# Patient Record
Sex: FEMALE | Race: WHITE | URBAN - METROPOLITAN AREA
[De-identification: names, ages, dates, MRNs, and addresses within clinical notes are randomized per-mention and may not be internally consistent; named-entity substitution may affect disease eponyms.]

---

## 2023-10-11 PROBLEM — Z95.818 IMPLANTABLE LOOP RECORDER PRESENT: Status: ACTIVE | Noted: 2023-10-11

## 2023-10-11 PROBLEM — I47.10 PSVT (PAROXYSMAL SUPRAVENTRICULAR TACHYCARDIA): Status: ACTIVE | Noted: 2023-10-11

## 2023-10-11 PROBLEM — I48.0 PAF (PAROXYSMAL ATRIAL FIBRILLATION) (HCC): Status: ACTIVE | Noted: 2023-10-11

## 2023-10-11 NOTE — PROGRESS NOTES
Left atrium: The atrium was mildly dilated. 5. Right ventricle: The cavity size was normal. Systolic function      was reduced. 6. Right atrium: The atrium was mildly dilated. 7. Pulmonary arteries: Systolic pressure was within the normal      range. Estimated PA peak pressure is 22mm Hg (S). 8. Pericardium, extracardiac: A moderate, partially loculated      pericardial effusion was identified posterior to the heart and      along the left ventricular free wall, with no evidence of      tamponade. Nuclear stress: 9/2017  Exercise nuclear stress test. 6 minutes. Negative for ischemia. Normal perfusion and function    Event Monitor: 7/2014  1. Baseline rhythm is sinus  2. occasional premature atrial contractions  3. occasional premature ventricular contractions. One episode of ventricular bigeminy. 4. 2 episodes of nonsustained ventricular tachycardia. One was 6 beats at a heart rate of 136 beats per minute. The other was 5 beats followed by a pause and then 4 beats after that. 5. patient developed SVT at rate of around 170 beats per minute for 3 minutes on 27 July 2014 at around 16:38 hrs. patient was symptomatic during this episode. 6. Monitor period was 30 days. 7. recommend further workup if clinically indicated. Cath: 3/2011  No CAD. Had positive cardiac bio-markers from svt        I independently reviewed the cardiac diagnostic studies, ECG and relevant imaging studies. No results found for: \"LVEF\", \"LVEFMODE\"  No results found for: \"TSHFT4\", \"TSH\"    Physical Examination:  Vitals:    10/12/23 1356   BP: 112/74   Pulse: 80   SpO2: 98%      Wt Readings from Last 3 Encounters:   10/12/23 159 lb (72.1 kg)   04/02/15 162 lb (73.5 kg)       Constitutional: Oriented. No distress. Head: Normocephalic and atraumatic. Mouth/Throat: Oropharynx is clear and moist.   Eyes: Conjunctivae normal. EOM are normal.   Neck: Neck supple. No rigidity. No JVD present.     Cardiovascular: Normal rate,

## 2023-10-12 ENCOUNTER — OFFICE VISIT (OUTPATIENT)
Dept: CARDIOLOGY CLINIC | Age: 68
End: 2023-10-12
Payer: MEDICARE

## 2023-10-12 VITALS
WEIGHT: 159 LBS | HEIGHT: 67 IN | SYSTOLIC BLOOD PRESSURE: 112 MMHG | DIASTOLIC BLOOD PRESSURE: 74 MMHG | BODY MASS INDEX: 24.96 KG/M2 | HEART RATE: 80 BPM | OXYGEN SATURATION: 98 %

## 2023-10-12 DIAGNOSIS — I47.10 PSVT (PAROXYSMAL SUPRAVENTRICULAR TACHYCARDIA): ICD-10-CM

## 2023-10-12 DIAGNOSIS — G47.33 OSA ON CPAP: ICD-10-CM

## 2023-10-12 DIAGNOSIS — R06.02 SOB (SHORTNESS OF BREATH): ICD-10-CM

## 2023-10-12 DIAGNOSIS — Z95.818 IMPLANTABLE LOOP RECORDER PRESENT: ICD-10-CM

## 2023-10-12 DIAGNOSIS — I34.1 MVP (MITRAL VALVE PROLAPSE): ICD-10-CM

## 2023-10-12 DIAGNOSIS — I48.0 PAF (PAROXYSMAL ATRIAL FIBRILLATION) (HCC): Primary | ICD-10-CM

## 2023-10-12 PROCEDURE — 99204 OFFICE O/P NEW MOD 45 MIN: CPT | Performed by: INTERNAL MEDICINE

## 2023-10-12 PROCEDURE — 93000 ELECTROCARDIOGRAM COMPLETE: CPT | Performed by: INTERNAL MEDICINE

## 2023-10-12 PROCEDURE — 1123F ACP DISCUSS/DSCN MKR DOCD: CPT | Performed by: INTERNAL MEDICINE

## 2023-10-12 RX ORDER — ESCITALOPRAM OXALATE 20 MG/1
20 TABLET ORAL DAILY
COMMUNITY
Start: 2003-01-01

## 2023-10-12 RX ORDER — OMEPRAZOLE 40 MG/1
CAPSULE, DELAYED RELEASE ORAL
COMMUNITY
Start: 2023-07-24

## 2023-10-12 RX ORDER — APIXABAN 5 MG/1
TABLET, FILM COATED ORAL
COMMUNITY
Start: 2023-08-23

## 2023-10-12 RX ORDER — BUPROPION HYDROCHLORIDE 300 MG/1
TABLET ORAL
COMMUNITY
Start: 2023-09-20

## 2023-10-12 RX ORDER — METOPROLOL SUCCINATE 50 MG/1
TABLET, EXTENDED RELEASE ORAL
COMMUNITY
Start: 2023-09-20

## 2023-10-20 ENCOUNTER — PROCEDURE VISIT (OUTPATIENT)
Dept: CARDIOLOGY CLINIC | Age: 68
End: 2023-10-20

## 2023-10-20 DIAGNOSIS — I34.1 MVP (MITRAL VALVE PROLAPSE): ICD-10-CM

## 2023-10-20 DIAGNOSIS — I48.0 PAF (PAROXYSMAL ATRIAL FIBRILLATION) (HCC): ICD-10-CM

## 2023-10-20 DIAGNOSIS — I47.10 PSVT (PAROXYSMAL SUPRAVENTRICULAR TACHYCARDIA): ICD-10-CM

## 2023-10-26 NOTE — PROGRESS NOTES
[facility]  Advanced CHF/Pulmonary Hypertension   Cardiac Evaluation      Gabriel Nixon  YOB: 1955    Date of Visit:  10/27/23    Chief Complaint   Patient presents with    Established New Doctor    Shortness of Breath    Fatigue      History of Present Illness:  Myrtle Mao is a 76 y.o. female who presents from referral from Dr. Mitra Segovia for consultation and management of ongoing dyspnea. He recently saw her as a new patient with decreased stamina/energy, fatigue, and SOB. She is s/p Covid in September; she had episode of very high platelets that was treated with 3 pheresis treatments. She has a PMH HTN, MVP, MVr 08/2022, PAF post op MVr. 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 Her appendage was not occluded with her MV surgery. ECHO 10/20/23 with normal LV fx, LVH, bi-atrial enlargement. 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 Today, she states she feels she improved after surgery but has declined more recently with SOB. She completed Cardiac Rehab at [facility] after heart surgery. She checks BP at home 114/70's. She does not recall being on ACE/ARB. States she has been on Metoprolol for years due to SVTs. She states she just mailed her Holter back. She follows with Hematology at the [facility]. Last H/H in care everywhere 10.8/33.2. She will start Hydroxyurea. platelet count now 326. She states she is not symptomatic. If she becomes symptomatic with Myelofibrosis/ hemohromatosis, she will need a Stem Cell transplant. She was told to watch for night sweats, bone pain, mouth sores and weight loss. Discussed diastolic dysfunction with her blood disorder and mitral valve disease. Discussed Entresto. Also discussed patient's elevated potassium level and foods to avoid. We walked her in the halls today and she desaturated to 83-84% on room air after approx 200-300ft.   She had a rapid recovery to 96% with rest.  Heart rate increased
Laterality Date    BREAST ENHANCEMENT SURGERY      CT BONE MARROW BIOPSY  2023    CT BONE MARROW BIOPSY 2023     Family History   Problem Relation Age of Onset    Other Mother         blood disease    Diabetes Mother     Alcohol Abuse Father     Arrhythmia Brother     Mitral Valve Prolapse Brother      Social History     Socioeconomic History    Marital status:      Spouse name: Not on file    Number of children: Not on file    Years of education: Not on file    Highest education level: Not on file   Occupational History    Not on file   Tobacco Use    Smoking status: Former     Types: Cigarettes     Quit date: 1980     Years since quittin.1    Smokeless tobacco: Not on file   Vaping Use    Vaping Use: Some days   Substance and Sexual Activity    Alcohol use: Yes    Drug use: Never    Sexual activity: Defer   Other Topics Concern    Not on file   Social History Narrative    Not on file     Social Determinants of Health     Financial Resource Strain: Not on file   Food Insecurity: Not on file   Transportation Needs: Not on file   Physical Activity: Not on file   Stress: Not on file   Social Connections: Not on file   Intimate Partner Violence: Not on file   Housing Stability: Not on file       Review of Systems:   Constitutional: there has been no unanticipated weight loss. There's been no change in energy level, sleep pattern, or activity level. Eyes: No visual changes or diplopia. No scleral icterus. ENT: No Headaches, hearing loss or vertigo. No mouth sores or sore throat. Cardiovascular: Reviewed in HPI  Respiratory: No cough or wheezing, no sputum production. No hematemesis. Gastrointestinal: No abdominal pain, appetite loss, blood in stools. No change in bowel or bladder habits. Genitourinary: No dysuria, trouble voiding, or hematuria. Musculoskeletal:  No gait disturbance, weakness or joint complaints. Integumentary: No rash or pruritis.   Neurological: No headache,

## 2023-10-27 ENCOUNTER — OFFICE VISIT (OUTPATIENT)
Dept: CARDIOLOGY CLINIC | Age: 68
End: 2023-10-27

## 2023-10-27 VITALS
BODY MASS INDEX: 24.99 KG/M2 | OXYGEN SATURATION: 96 % | DIASTOLIC BLOOD PRESSURE: 44 MMHG | HEART RATE: 84 BPM | HEIGHT: 67 IN | SYSTOLIC BLOOD PRESSURE: 90 MMHG | WEIGHT: 159.2 LBS

## 2023-10-27 DIAGNOSIS — E83.110 HEREDITARY HEMOCHROMATOSIS (HCC): ICD-10-CM

## 2023-10-27 DIAGNOSIS — G47.33 OSA ON CPAP: ICD-10-CM

## 2023-10-27 DIAGNOSIS — R06.02 SOB (SHORTNESS OF BREATH): ICD-10-CM

## 2023-10-27 DIAGNOSIS — I51.89 DIASTOLIC DYSFUNCTION: Primary | ICD-10-CM

## 2023-10-27 DIAGNOSIS — R53.83 OTHER FATIGUE: ICD-10-CM

## 2023-10-27 DIAGNOSIS — I48.0 PAF (PAROXYSMAL ATRIAL FIBRILLATION) (HCC): ICD-10-CM

## 2023-10-27 DIAGNOSIS — R09.02 HYPOXIA: ICD-10-CM

## 2023-10-27 DIAGNOSIS — I34.1 MVP (MITRAL VALVE PROLAPSE): ICD-10-CM

## 2023-10-27 RX ORDER — METOPROLOL SUCCINATE 50 MG/1
25 TABLET, EXTENDED RELEASE ORAL EVERY MORNING
Qty: 30 TABLET | Refills: 0
Start: 2023-10-27

## 2023-10-27 NOTE — PATIENT INSTRUCTIONS
Plan:  Decrease Metoprolol to 25mg daily for now. Dr. Gautam Zaragoza may change that once the monitor has resulted. 2.   START Entresto 24-26mg HALF tablet twice a day. 3.   Labs in 2-3 weeks> BNP, CMP, TSH, Free T4.   4.   May begin treadmill exercise and watch pulse ox. Document the Oxygen level with Exercise and the Heart rate on the Lower dose of metoprolol. Also document BP on the new medication Entresto. 5.  Get scheduled for Cardiac MRI at [facility]   6.  Referral to Dr. Nacho Ponce for both KIESHA and Pulmonology/hypoxia. 7.  See Dr. Kashif José in 4-5 weeks.

## 2023-11-15 ENCOUNTER — TELEPHONE (OUTPATIENT)
Dept: CARDIOLOGY CLINIC | Age: 68
End: 2023-11-15

## 2023-11-16 LAB
ALBUMIN SERPL-MCNC: 4.5 G/DL (ref 3.5–5.2)
ALP BLD-CCNC: 104 IU/L (ref 36–123)
ALT SERPL-CCNC: 10 IU/L (ref 10–40)
ANION GAP SERPL CALCULATED.3IONS-SCNC: 7 MMOL/L (ref 4–16)
AST SERPL-CCNC: 27 IU/L (ref 10–40)
B-TYPE NATRIURETIC PEPTIDE: 240 PG/ML (ref 0–80)
BILIRUB SERPL-MCNC: 0.3 MG/DL (ref 0–1.2)
BUN BLDV-MCNC: 16 MG/DL (ref 8–26)
CALCIUM SERPL-MCNC: 9.6 MG/DL (ref 8.5–10.4)
CHLORIDE BLD-SCNC: 105 MEQ/L (ref 98–111)
CO2: 28 MMOL/L (ref 21–31)
CREAT SERPL-MCNC: 0.75 MG/DL (ref 0.6–1.2)
EGFR (CKD-EPI): 86 ML/MIN/1.73 M2
GLUCOSE BLD-MCNC: 88 MG/DL (ref 70–99)
POTASSIUM SERPL-SCNC: 5 MEQ/L (ref 3.6–5.1)
SODIUM BLD-SCNC: 140 MEQ/L (ref 135–145)
T4 FREE: 0.93 NG/DL (ref 0.93–1.7)
TOTAL PROTEIN: 6.5 G/DL (ref 6.4–8.3)
TSH ULTRASENSITIVE: 3.18 MCIU/ML (ref 0.27–4.2)

## 2023-11-16 NOTE — TELEPHONE ENCOUNTER
LMOM for patient letting her know, I have faxed over lab orders to [facility].

## 2023-11-21 NOTE — PROGRESS NOTES
needed for increased heart rate. 110 tablet 3    sacubitril-valsartan (ENTRESTO) 24-26 MG per tablet Take 1 tablet by mouth 2 times daily 180 tablet 3    sacubitril-valsartan (ENTRESTO) 24-26 MG per tablet Take 1 tablet by mouth 2 times daily 1 Bottle   [id number]   28 tablet 0    Boswellia-Glucosamine-Vit D (OSTEO BI-FLEX ONE PER DAY PO) Take by mouth daily      MAGNESIUM OXIDE PO Take 400 mg by mouth daily      MELATONIN PO Take by mouth      ELIQUIS 5 MG TABS tablet 1 tablet 2 times daily      buPROPion (WELLBUTRIN XL) 300 MG extended release tablet 1 tablet every morning      escitalopram (LEXAPRO) 20 MG tablet Take 1 tablet by mouth daily      omeprazole (PRILOSEC) 40 MG delayed release capsule 1 capsule as needed (heart burn)      citalopram (CELEXA) 20 MG tablet TAKE 1 TABLET BY MOUTH ONCE DAILY      Multiple Vitamins-Minerals (CENTRUM SILVER PO) Take  by mouth. aspirin 81 MG EC tablet Take 1 tablet by mouth daily       No current facility-administered medications for this visit. Past Medical History:   Diagnosis Date    Anxiety     Blood disorder     Depression     Heart murmur     Hemochromatosis     MVP (mitral valve prolapse)     SVT (supraventricular tachycardia)     Thrombocytosis      Past Surgical History:   Procedure Laterality Date    BREAST ENHANCEMENT SURGERY      CT BONE MARROW BIOPSY  2023    CT BONE MARROW BIOPSY 2023     Family History   Problem Relation Age of Onset    Other Mother         blood disease    Diabetes Mother     Alcohol Abuse Father     Arrhythmia Brother     Mitral Valve Prolapse Brother      Social History     Socioeconomic History    Marital status:       Spouse name: Not on file    Number of children: Not on file    Years of education: Not on file    Highest education level: Not on file   Occupational History    Not on file   Tobacco Use    Smoking status: Former     Types: Cigarettes     Quit date: 1980     Years since quittin.2

## 2023-12-01 ENCOUNTER — OFFICE VISIT (OUTPATIENT)
Dept: CARDIOLOGY CLINIC | Age: 68
End: 2023-12-01

## 2023-12-01 VITALS
HEIGHT: 67 IN | BODY MASS INDEX: 25.11 KG/M2 | SYSTOLIC BLOOD PRESSURE: 90 MMHG | WEIGHT: 160 LBS | OXYGEN SATURATION: 97 % | HEART RATE: 79 BPM | DIASTOLIC BLOOD PRESSURE: 60 MMHG

## 2023-12-01 DIAGNOSIS — I48.0 PAF (PAROXYSMAL ATRIAL FIBRILLATION) (HCC): ICD-10-CM

## 2023-12-01 DIAGNOSIS — E83.110 HEREDITARY HEMOCHROMATOSIS (HCC): ICD-10-CM

## 2023-12-01 DIAGNOSIS — G47.33 OSA ON CPAP: ICD-10-CM

## 2023-12-01 DIAGNOSIS — I51.89 DIASTOLIC DYSFUNCTION: Primary | ICD-10-CM

## 2023-12-01 DIAGNOSIS — I34.1 MVP (MITRAL VALVE PROLAPSE): ICD-10-CM

## 2023-12-01 DIAGNOSIS — R09.02 HYPOXIA: ICD-10-CM

## 2023-12-01 DIAGNOSIS — R06.02 SOB (SHORTNESS OF BREATH): ICD-10-CM

## 2023-12-01 RX ORDER — METOPROLOL SUCCINATE 25 MG/1
25 TABLET, EXTENDED RELEASE ORAL EVERY MORNING
Qty: 110 TABLET | Refills: 3 | Status: SHIPPED | OUTPATIENT
Start: 2023-12-01

## 2023-12-13 ENCOUNTER — HOSPITAL ENCOUNTER (OUTPATIENT)
Dept: MRI IMAGING | Age: 68
Discharge: HOME | End: 2023-12-13
Payer: MEDICARE

## 2023-12-13 PROBLEM — R09.02 HYPOXIA: Status: ACTIVE | Noted: 2023-12-13

## 2023-12-13 PROBLEM — R06.02 SOB (SHORTNESS OF BREATH): Status: ACTIVE | Noted: 2023-12-13

## 2023-12-13 PROCEDURE — A4216 STERILE WATER/SALINE, 10 ML: HCPCS | Performed by: INTERNAL MEDICINE

## 2023-12-13 PROCEDURE — A9585 GADOBUTROL INJECTION: HCPCS | Performed by: INTERNAL MEDICINE

## 2023-12-13 PROCEDURE — 75561 CARDIAC MRI FOR MORPH W/DYE: CPT | Performed by: INTERNAL MEDICINE

## 2023-12-13 PROCEDURE — 6360000004 HC RX CONTRAST MEDICATION: Performed by: INTERNAL MEDICINE

## 2023-12-13 PROCEDURE — 75565 CARD MRI VELOC FLOW MAPPING: CPT | Performed by: INTERNAL MEDICINE

## 2023-12-13 PROCEDURE — 2580000003 HC RX 258: Performed by: INTERNAL MEDICINE

## 2023-12-13 PROCEDURE — 75565 CARD MRI VELOC FLOW MAPPING: CPT

## 2023-12-13 RX ORDER — GADOBUTROL 604.72 MG/ML
13 INJECTION INTRAVENOUS
Status: COMPLETED | OUTPATIENT
Start: 2023-12-13 | End: 2023-12-13

## 2023-12-13 RX ORDER — SODIUM CHLORIDE 9 MG/ML
10 INJECTION INTRAVENOUS PRN
Status: DISCONTINUED | OUTPATIENT
Start: 2023-12-13 | End: 2023-12-14 | Stop reason: HOSPADM

## 2023-12-13 RX ADMIN — GADOBUTROL 13 ML: 604.72 INJECTION INTRAVENOUS at 10:53

## 2023-12-13 RX ADMIN — SODIUM CHLORIDE 10 ML: 9 INJECTION, SOLUTION INTRAMUSCULAR; INTRAVENOUS; SUBCUTANEOUS at 10:53

## 2024-01-02 ENCOUNTER — HOSPITAL ENCOUNTER (OUTPATIENT)
Age: 69
Discharge: HOME | End: 2024-01-02
Payer: COMMERCIAL

## 2024-01-02 ENCOUNTER — TELEPHONE (OUTPATIENT)
Dept: CARDIOLOGY CLINIC | Age: 69
End: 2024-01-02

## 2024-01-02 ENCOUNTER — OFFICE VISIT (OUTPATIENT)
Dept: PULMONOLOGY | Age: 69
End: 2024-01-02
Payer: COMMERCIAL

## 2024-01-02 VITALS
BODY MASS INDEX: 25.74 KG/M2 | SYSTOLIC BLOOD PRESSURE: 122 MMHG | OXYGEN SATURATION: 96 % | DIASTOLIC BLOOD PRESSURE: 80 MMHG | WEIGHT: 164 LBS | HEART RATE: 84 BPM | HEIGHT: 67 IN

## 2024-01-02 DIAGNOSIS — I48.0 PAF (PAROXYSMAL ATRIAL FIBRILLATION) (HCC): ICD-10-CM

## 2024-01-02 DIAGNOSIS — G47.33 OSA ON CPAP: ICD-10-CM

## 2024-01-02 DIAGNOSIS — R09.02 HYPOXIA: ICD-10-CM

## 2024-01-02 DIAGNOSIS — R06.02 SOB (SHORTNESS OF BREATH): Primary | ICD-10-CM

## 2024-01-02 DIAGNOSIS — R06.02 SOB (SHORTNESS OF BREATH): ICD-10-CM

## 2024-01-02 DIAGNOSIS — E83.110 HEREDITARY HEMOCHROMATOSIS (HCC): ICD-10-CM

## 2024-01-02 DIAGNOSIS — I51.89 DIASTOLIC DYSFUNCTION: ICD-10-CM

## 2024-01-02 DIAGNOSIS — J96.01 ACUTE HYPOXEMIC RESPIRATORY FAILURE (HCC): ICD-10-CM

## 2024-01-02 DIAGNOSIS — I34.1 MVP (MITRAL VALVE PROLAPSE): ICD-10-CM

## 2024-01-02 DIAGNOSIS — I50.32 CHRONIC DIASTOLIC HEART FAILURE (HCC): ICD-10-CM

## 2024-01-02 DIAGNOSIS — I47.10 PSVT (PAROXYSMAL SUPRAVENTRICULAR TACHYCARDIA): ICD-10-CM

## 2024-01-02 DIAGNOSIS — D50.9 IRON DEFICIENCY ANEMIA, UNSPECIFIED IRON DEFICIENCY ANEMIA TYPE: Primary | ICD-10-CM

## 2024-01-02 LAB
ANION GAP SERPL CALCULATED.3IONS-SCNC: 8 MMOL/L (ref 3–16)
BUN SERPL-MCNC: 19 MG/DL (ref 7–20)
CALCIUM SERPL-MCNC: 8.7 MG/DL (ref 8.3–10.6)
CHLORIDE SERPL-SCNC: 110 MMOL/L (ref 99–110)
CO2 SERPL-SCNC: 27 MMOL/L (ref 21–32)
CREAT SERPL-MCNC: 0.8 MG/DL (ref 0.6–1.2)
GFR SERPLBLD CREATININE-BSD FMLA CKD-EPI: >60 ML/MIN/{1.73_M2}
GLUCOSE SERPL-MCNC: 103 MG/DL (ref 70–99)
NT-PROBNP SERPL-MCNC: 450 PG/ML (ref 0–124)
POTASSIUM SERPL-SCNC: 5.2 MMOL/L (ref 3.5–5.1)
SODIUM SERPL-SCNC: 145 MMOL/L (ref 136–145)

## 2024-01-02 PROCEDURE — 1123F ACP DISCUSS/DSCN MKR DOCD: CPT | Performed by: INTERNAL MEDICINE

## 2024-01-02 PROCEDURE — 80048 BASIC METABOLIC PNL TOTAL CA: CPT

## 2024-01-02 PROCEDURE — 99204 OFFICE O/P NEW MOD 45 MIN: CPT | Performed by: INTERNAL MEDICINE

## 2024-01-02 PROCEDURE — 36415 COLL VENOUS BLD VENIPUNCTURE: CPT

## 2024-01-02 PROCEDURE — 83880 ASSAY OF NATRIURETIC PEPTIDE: CPT

## 2024-01-02 ASSESSMENT — ENCOUNTER SYMPTOMS
NAUSEA: 0
CONSTIPATION: 0
DIARRHEA: 0
ABDOMINAL PAIN: 0
SINUS PRESSURE: 0

## 2024-01-02 NOTE — PROGRESS NOTES
[facility]  Consult Note  Issac Herrera MD       Lluvia Nixon   YOB: 1955    Date of Visit:  1/2/2024    Assessment/Plan:  1. SOB (shortness of breath)  2. Acute hypoxemic respiratory failure (HCC)  3. Chronic diastolic heart failure (HCC)  4. PSVT (paroxysmal supraventricular tachycardia)    This is an interesting presentation.  Clearly desaturated with exertion when she was in Dr. Lane's office.  However, walking down the duran today she was able to maintain her saturations.  I am concerned about her hypoxemia especially with thrombocytosis.  There is a potential for thromboembolic disease including chronic PE.  Will start with CTPA but she also may need a V/Q scan at some point.  Will also get pulmonary function testing for her.  Will have her back in about 1 month      Chief Complaint   Patient presents with    Hypoxia       HPI  The patient is referred by Dr. Lane due to shortness of breath.  Also, the patient was noted to have exertional hypoxemia when she was last in the cardiology office.  She desaturated into the low 80s just walking down the hallway.  She really has not had any past history of pulmonary problems.  At one time it was thought that she had obstructive sleep apnea.  However, she had sleep testing at [facility] that was normal.  Her AHI was 0.4.  Notably, O2 saturation naveen was 91% during that study in 2015.  She does have a remote smoking history.  There is no history of asthma.  She is not taking any inhaled medication and does not really noticed wheezing or cough.  She notes that if she exerts herself she will sometimes have to sit and rest.  Some of this may be dyspnea.  However, some of it is better described by fatigue.  She does have a history of chronic diastolic heart failure.  She had a recent mitral valve repair at [facility].  She is also known to have myelofibrosis.  As a result she has chronic anemia

## 2024-01-02 NOTE — TELEPHONE ENCOUNTER
Pt called to request an order for the iron test Ferritin.  Pt will be over this way in hour and wants to get her labs  done.  Please upload the order in Epic.  Thank you

## 2024-01-02 NOTE — PROGRESS NOTES
[facility]   Electrophysiology Follow Up  Date: 1/3/2024    CC: PAF  HPI: Lluvia Nixon is a 68 y.o. female with PMH HTN, MVP, MVr 08/2022, PAF post op MVr. H/o AVNRT ablation  06/2015 .     her appendage was not occluded with MV surgery     ECHO 10/20/23 with normal LV fx, LVH, severe bi-atrial enlargement.    2 week holter 10/2023 showed no recurrent afib    Interval History:  Lluvia presents to the office in follow up. Her shortness of breath is better since starting the Entresto. Patient denies lightheadedness, dizziness, chest pain, palpitations, orthopnea, edema, presyncope or syncope. Had palpitations on toprol XL 25 mg daily, better increased back to 50 mg daily.       Assessment and plan:   Paroxysmal atrial fibrillation/PAT   -ECG today shows Sinus rhythm    -Noted post MVr - 08/2022    -Continue Topro XL 50 mg daily, may take an extra for elevated HR, she felt increased palpitations on her 25 mg dose.  -TSH 3.1 01/25/2023    -Patient has a [id number] Score of 3 ( age, gender, HTN) Continue Eliquis 5 mg daily, her appendage was not occluded with MV surgery   -2 week holter 10/2023 showed no recurrent afib did have episodes of PAT lasting 2-3 seconds     Will continue current management.  She can be seen once a year or as needed.    Diastolic Dysfunction/HFpEF   -Managed by the HF team   -Continue medical therapy with entresto and toprol XL    PSVT/AVNRT   - associated with lightheadedness   - had vasovagal syncope 20s   - s/p AVNRT ablation  6/2015    - has followed with Dr. Silvio Pedro at [place]     KIESHA  -Stable: Uses CPAP   -Encourage to use machine to prevent long term effects of untreated KIESHA     MV disease   -MVr/annuloplasty 08/30/2022 Dr. Tr Erickson   [facility]    -Continue atenolol     Myelofibrosis/ hemohromatosis   -Follows at [facility]      Plan:   Follow up 1 year or as needed       Patient Active Problem List    Diagnosis Date Noted    Chronic

## 2024-01-03 ENCOUNTER — OFFICE VISIT (OUTPATIENT)
Dept: CARDIOLOGY CLINIC | Age: 69
End: 2024-01-03
Payer: COMMERCIAL

## 2024-01-03 VITALS
WEIGHT: 163.1 LBS | HEART RATE: 75 BPM | SYSTOLIC BLOOD PRESSURE: 132 MMHG | BODY MASS INDEX: 25.6 KG/M2 | HEIGHT: 67 IN | OXYGEN SATURATION: 97 % | DIASTOLIC BLOOD PRESSURE: 72 MMHG

## 2024-01-03 VITALS
OXYGEN SATURATION: 97 % | BODY MASS INDEX: 25.74 KG/M2 | HEIGHT: 67 IN | HEART RATE: 76 BPM | SYSTOLIC BLOOD PRESSURE: 120 MMHG | DIASTOLIC BLOOD PRESSURE: 80 MMHG | WEIGHT: 164 LBS

## 2024-01-03 DIAGNOSIS — I50.32 CHRONIC DIASTOLIC HEART FAILURE (HCC): ICD-10-CM

## 2024-01-03 DIAGNOSIS — I47.10 PSVT (PAROXYSMAL SUPRAVENTRICULAR TACHYCARDIA): Primary | ICD-10-CM

## 2024-01-03 DIAGNOSIS — I51.89 DIASTOLIC DYSFUNCTION: Primary | ICD-10-CM

## 2024-01-03 DIAGNOSIS — G47.33 OSA ON CPAP: ICD-10-CM

## 2024-01-03 DIAGNOSIS — R06.02 SOB (SHORTNESS OF BREATH): ICD-10-CM

## 2024-01-03 DIAGNOSIS — I48.0 PAF (PAROXYSMAL ATRIAL FIBRILLATION) (HCC): ICD-10-CM

## 2024-01-03 DIAGNOSIS — D50.9 IRON DEFICIENCY ANEMIA, UNSPECIFIED IRON DEFICIENCY ANEMIA TYPE: ICD-10-CM

## 2024-01-03 DIAGNOSIS — I34.1 MVP (MITRAL VALVE PROLAPSE): ICD-10-CM

## 2024-01-03 DIAGNOSIS — E83.110 HEREDITARY HEMOCHROMATOSIS (HCC): ICD-10-CM

## 2024-01-03 DIAGNOSIS — R09.02 HYPOXIA: ICD-10-CM

## 2024-01-03 LAB
FERRITIN SERPL IA-MCNC: 365.9 NG/ML (ref 15–150)
IRON SATN MFR SERPL: 84 % (ref 15–50)
IRON SERPL-MCNC: 201 UG/DL (ref 37–145)
TIBC SERPL-MCNC: 238 UG/DL (ref 260–445)

## 2024-01-03 PROCEDURE — 99215 OFFICE O/P EST HI 40 MIN: CPT | Performed by: INTERNAL MEDICINE

## 2024-01-03 PROCEDURE — 1123F ACP DISCUSS/DSCN MKR DOCD: CPT | Performed by: INTERNAL MEDICINE

## 2024-01-03 PROCEDURE — 93000 ELECTROCARDIOGRAM COMPLETE: CPT | Performed by: INTERNAL MEDICINE

## 2024-01-03 PROCEDURE — 99214 OFFICE O/P EST MOD 30 MIN: CPT | Performed by: INTERNAL MEDICINE

## 2024-01-03 RX ORDER — METOPROLOL SUCCINATE 50 MG/1
50 TABLET, EXTENDED RELEASE ORAL EVERY MORNING
Qty: 90 TABLET | Refills: 3 | Status: SHIPPED | OUTPATIENT
Start: 2024-01-03

## 2024-01-03 NOTE — PATIENT INSTRUCTIONS
Plan:   Increase Entresto evening Dose to a FULL tablet for two weeks and see how you feel.  Then increase Morning dose to a FULL tablet.    2.   BMP, BNP at the End of February.    3.   Continue your same dose of Metoprolol 50mg daily.   4.   Continue all other medications.   5    See Dr. Lane at the end of February.

## 2024-01-03 NOTE — TELEPHONE ENCOUNTER
Patient was seen in the office today. Can Labs be placed for ferritin/iron studies?  Please advise    Spoke to the lab they will add the labs.

## 2024-01-03 NOTE — PROGRESS NOTES
[facility]  Advanced CHF/Pulmonary Hypertension   Cardiac Evaluation      Lluvia Nixon  YOB: 1955    Date of Visit:  1/3/24    Chief Complaint   Patient presents with    1 Month Follow-Up    Congestive Heart Failure      History of Present Illness:  Lluvia Nixon is a 68 y.o. female who presents from referral from Dr. De La Cruz for consultation and management of ongoing dyspnea. He recently saw her as a new patient with decreased stamina/energy, fatigue, and SOB. She is s/p Covid in September; she had episode of very high platelets that was treated with 3 pheresis treatments.     She has a PMH HTN, MVP, MVr 08/2022, PAF post op MVr. 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 Her appendage was not occluded with her MV surgery.      ECHO 10/20/23 with normal LV fx, LVH, bi-atrial enlargement.    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      She states she felt better and more energized since starting Entresto. ~Start Entresto 24-26mg HALF tablet twice a day 10/28/23    Today, she is here for a follow up for diastolic heart failure. Reviewed her cardiac MRI. Normal EF and normal strain and no scarring or inflammation of the heart.  No evidence of hemochromatosis of the heart.  All good things.  Labs reviewed from 1/2/24.  Talked about optimizing as best as possible.  She states she did not desat at her pulmonology appt.  Discussed increasing Entresto at first only the night time dose and then two weeks later, the morning dose to a full tablet.  She is taking Metoprolol 50mg daily. She is scheduled for her pulmonary testing.  She states she sees Dr. De La Cruz later today.  She hopes to vacation to [place] in March.  Her oxygen saturations with exercise are much better, no desatting like she was.      NYHA Class 2      Allergies   Allergen Reactions    Latex Itching and Rash    Adhesive Tape Rash     Current Outpatient Medications   Medication Sig Dispense Refill    metoprolol

## 2024-01-18 ENCOUNTER — HOSPITAL ENCOUNTER (OUTPATIENT)
Dept: PULMONOLOGY | Age: 69
Discharge: HOME | End: 2024-01-18
Attending: INTERNAL MEDICINE
Payer: COMMERCIAL

## 2024-01-18 ENCOUNTER — HOSPITAL ENCOUNTER (OUTPATIENT)
Dept: CT IMAGING | Age: 69
Discharge: HOME | End: 2024-01-18
Attending: INTERNAL MEDICINE
Payer: COMMERCIAL

## 2024-01-18 VITALS — HEIGHT: 68 IN | WEIGHT: 160 LBS | BODY MASS INDEX: 24.25 KG/M2

## 2024-01-18 DIAGNOSIS — R06.02 SOB (SHORTNESS OF BREATH): ICD-10-CM

## 2024-01-18 DIAGNOSIS — J96.01 ACUTE HYPOXEMIC RESPIRATORY FAILURE (HCC): ICD-10-CM

## 2024-01-18 LAB
DLCO %PRED: 51 %
DLCO PRED: NORMAL
DLCO/VA %PRED: NORMAL
DLCO/VA PRED: NORMAL
DLCO/VA: NORMAL
DLCO: NORMAL
EXPIRATORY TIME-POST: NORMAL
EXPIRATORY TIME: NORMAL
FEF 25-75 %CHNG: NORMAL
FEF 25-75 POST %PRED: NORMAL
FEF 25-75% %PRED-PRE: NORMAL
FEF 25-75% PRED: NORMAL
FEF 25-75-POST: NORMAL
FEF 25-75-PRE: NORMAL
FEV1 %PRED-POST: 77 %
FEV1 %PRED-PRE: 71 %
FEV1 PRED: NORMAL
FEV1-POST: NORMAL
FEV1-PRE: NORMAL
FEV1/FVC %PRED-POST: NORMAL
FEV1/FVC %PRED-PRE: NORMAL
FEV1/FVC PRED: NORMAL
FEV1/FVC-POST: 81 %
FEV1/FVC-PRE: 76 %
FVC %PRED-POST: NORMAL
FVC %PRED-PRE: NORMAL
FVC PRED: NORMAL
FVC-POST: NORMAL
FVC-PRE: NORMAL
GAW %PRED: NORMAL
GAW PRED: NORMAL
GAW: NORMAL
IC %PRED: NORMAL
IC PRED: NORMAL
IC: NORMAL
MEP: NORMAL
MIP: NORMAL
MVV %PRED-PRE: NORMAL
MVV PRED: NORMAL
MVV-PRE: NORMAL
PEF %PRED-POST: NORMAL
PEF %PRED-PRE: NORMAL
PEF PRED: NORMAL
PEF%CHNG: NORMAL
PEF-POST: NORMAL
PEF-PRE: NORMAL
RAW %PRED: NORMAL
RAW PRED: NORMAL
RAW: NORMAL
RV %PRED: NORMAL
RV PRED: NORMAL
RV: NORMAL
SVC %PRED: NORMAL
SVC PRED: NORMAL
SVC: NORMAL
TLC %PRED: 86 %
TLC PRED: NORMAL
TLC: NORMAL
VA %PRED: NORMAL
VA PRED: NORMAL
VA: NORMAL
VTG %PRED: NORMAL
VTG PRED: NORMAL
VTG: NORMAL

## 2024-01-18 PROCEDURE — 94760 N-INVAS EAR/PLS OXIMETRY 1: CPT

## 2024-01-18 PROCEDURE — 71260 CT THORAX DX C+: CPT

## 2024-01-18 PROCEDURE — 6370000000 HC RX 637 (ALT 250 FOR IP): Performed by: INTERNAL MEDICINE

## 2024-01-18 PROCEDURE — 94618 PULMONARY STRESS TESTING: CPT

## 2024-01-18 PROCEDURE — 94060 EVALUATION OF WHEEZING: CPT

## 2024-01-18 PROCEDURE — 94729 DIFFUSING CAPACITY: CPT

## 2024-01-18 PROCEDURE — 6360000004 HC RX CONTRAST MEDICATION: Performed by: INTERNAL MEDICINE

## 2024-01-18 PROCEDURE — 94726 PLETHYSMOGRAPHY LUNG VOLUMES: CPT

## 2024-01-18 RX ORDER — ALBUTEROL SULFATE 90 UG/1
4 AEROSOL, METERED RESPIRATORY (INHALATION) ONCE
Status: COMPLETED | OUTPATIENT
Start: 2024-01-18 | End: 2024-01-18

## 2024-01-18 RX ADMIN — Medication 4 PUFF: at 15:30

## 2024-01-18 RX ADMIN — IOPAMIDOL 75 ML: 755 INJECTION, SOLUTION INTRAVENOUS at 13:14

## 2024-01-18 ASSESSMENT — PULMONARY FUNCTION TESTS
FEV1_PERCENT_PREDICTED_POST: 77
FEV1/FVC_PRE: 76
FEV1_PERCENT_PREDICTED_PRE: 71
FEV1/FVC_POST: 81

## 2024-01-18 ASSESSMENT — 6 MINUTE WALK TEST (6MWT)
HEART RATE: 92
% PREDICTED: 103.4
BORG DYSPNEA SCALE SCORE: 0
HEART RATE: 88
DID PATIENT STOP OR PAUSE BEFORE 6 MINUTES?: NO
HEART RATE: 73
BORG FATIGUE SCALE SCORE: 0
O2 SATURATION: 96
BORG FATIGUE SCALE SCORE: 0
O2 SATURATION: 96
O2 SATURATION: 96
SYMPTOMS: SOB
BORG FATIGUE SCALE SCORE: 0
BORG DYSPNEA SCALE SCORE: 5
O2 SATURATION: 98
OXYGEN DEVICE: ROOM AIR
TOTAL DISTANCE WALKED (M): 487
HEART RATE: 90
BORG DYSPNEA SCALE SCORE: 4

## 2024-01-18 NOTE — PROGRESS NOTES
01/18/24 1533   Data Measured Before Walk   Height 1.72 m (5' 7.72\")   Weight - Scale 72.6 kg (160 lb)   HR 73   O2 Saturation 96   O2 Device Room air   Joanie Dyspnea Scale 0   Joanie Fatigue Scale 0   Data Measured During the Walk   Heart Rate 88   O2 Saturation 98   Symptoms SOB   Joanie Dyspnea Scale 4   Joanie Fatigue Scale 0   Data Measured Immediately After Walk   Did Patient Stop or Pause Before 6 Minutes No   Predicted Distance (m) 471 Meters   Total Distance Walked (m) 487 Meters   % Predicted 103.4   Heart Rate 92   O2 Saturation 96   Joanie Dyspnea Scale 5   Joanie Fatigue Scale 0   Data Measured 5 Minutes After Walk   Heart Rate 90   O2 Saturation 96

## 2024-01-19 NOTE — PROCEDURES
Pulmonary Function Testing      Patient name:  Lluvia Nixon      Unit #:   [id number]   Date of test: 1/18/2024  Date of interpretation:   1/19/2024    Ms. Lluvia Nixon is a 68 y.o. year-old non smoker. The spirometry data were acceptable and reproducible.     Spirometry:  Flow volume loops were normal. The FEV-1/FVC ratio was normal. The  post-bronchodilator FEV-1 was 2.0 liters (77% of predicted), which was decreased. The FVC was 2.49 liters (72% of predicted), which was decreased. Response to inhaled bronchodilators (albuterol) was not significant.    Lung volumes:  Lung volumes were tested by plethysmography. The total lung capacity was 4.70 liters (86% of predicted), which was normal. The residual volume was 2.13 liters (99% of predicted), which was normal. The ratio of residual volume to total lung capacity (RV/TLC) was 114, which was normal. Specific airway resistance was normal.    Diffusion capacity was found to be decreased.       Interpretation:  Decreased diffusion capacity.  Normal spirometry and lung volumes.  Differentials include early interstitial lung disease, pulmonary hypertension, emphysema or anemia.  Clinical correlation recommended.  6-minute walk test showed that patient walked a total distance of 471 m without any desaturation.  Joanie dyspnea scale of 5.      Odalys Porras MD  [facility]   [address], [address], [place] [id number]

## 2024-01-29 ENCOUNTER — PATIENT MESSAGE (OUTPATIENT)
Dept: CARDIOLOGY CLINIC | Age: 69
End: 2024-01-29

## 2024-01-29 ENCOUNTER — OFFICE VISIT (OUTPATIENT)
Dept: PULMONOLOGY | Age: 69
End: 2024-01-29
Payer: COMMERCIAL

## 2024-01-29 VITALS
SYSTOLIC BLOOD PRESSURE: 124 MMHG | BODY MASS INDEX: 24.96 KG/M2 | OXYGEN SATURATION: 99 % | WEIGHT: 159 LBS | HEART RATE: 72 BPM | HEIGHT: 67 IN | DIASTOLIC BLOOD PRESSURE: 74 MMHG

## 2024-01-29 DIAGNOSIS — I47.10 PSVT (PAROXYSMAL SUPRAVENTRICULAR TACHYCARDIA): ICD-10-CM

## 2024-01-29 DIAGNOSIS — I50.32 CHRONIC DIASTOLIC HEART FAILURE (HCC): ICD-10-CM

## 2024-01-29 DIAGNOSIS — J98.4 PNEUMONITIS: Primary | ICD-10-CM

## 2024-01-29 PROCEDURE — 1123F ACP DISCUSS/DSCN MKR DOCD: CPT | Performed by: INTERNAL MEDICINE

## 2024-01-29 PROCEDURE — 99214 OFFICE O/P EST MOD 30 MIN: CPT | Performed by: INTERNAL MEDICINE

## 2024-01-29 RX ORDER — PREDNISONE 10 MG/1
TABLET ORAL
Qty: 30 TABLET | Refills: 0 | Status: SHIPPED | OUTPATIENT
Start: 2024-01-29 | End: 2024-02-08

## 2024-01-29 NOTE — TELEPHONE ENCOUNTER
Spoke with pt  The script was sent to [facility] 12/1/23 per pt request.  Based on that, we did not sent to local pharmacy under separate request.    Pt came to office this afternoon.    Discussed the above with her and she would like to stay with [facility].      She will research the prescription that is currently at [facility] and let us know if she wants a script to go elsewhere.

## 2024-01-29 NOTE — PROGRESS NOTES
[facility]  Consult Note  Issac Herrera MD       Lluvia Nixon   YOB: 1955    Date of Visit:  1/29/2024    Assessment/Plan:  1. SOB (shortness of breath)/cough  2. Pneumonitis  3. Chronic diastolic heart failure (HCC)  4. PSVT (paroxysmal supraventricular tachycardia)    I reviewed her PFT which was normal except she did have a decreased DLCO. She also showed improvement in FEF 25-75 after inhaled BD.    CT imaging shows some GGO at the left lung base. No obvious PE    Her six minute walk was normal and she did not desaturate.    Try her with a prednisone taper as the GGO may represent a pneumonitis perhaps related to COVID or other viral infection.      Chief Complaint   Patient presents with    Cough    Follow-up       HPI  The patient is referred by Dr. Lane due to shortness of breath.  Also, the patient was noted to have exertional hypoxemia when she was last in the cardiology office.  She desaturated into the low 80s just walking down the hallway.  She really has not had any past history of pulmonary problems.  At one time it was thought that she had obstructive sleep apnea.  However, she had sleep testing at [facility] that was normal.  Her AHI was 0.4.  Notably, O2 saturation naveen was 91% during that study in 2015.  She does have a remote smoking history.  There is no history of asthma.  She is not taking any inhaled medication and does not really noticed wheezing or cough.  She notes that if she exerts herself she will sometimes have to sit and rest.  Some of this may be dyspnea.  However, some of it is better described by fatigue.  She does have a history of chronic diastolic heart failure.  She had a recent mitral valve repair at [facility].  She is also known to have myelofibrosis.  As a result she has chronic anemia but also has chronic thrombocytosis.    She returns today feeling better. She had her Entresto dose increased.

## 2024-01-29 NOTE — TELEPHONE ENCOUNTER
Verenice Crain MA 1/29/2024 12:35 PM EST      ----- Message -----  From: Lluvia Nixon  Sent: 1/29/2024 11:28 AM EST  To: [facility]  Subject: Why no refill?     I checked in to be sure that my Entresto refill requested last week was ready. They had no request. Upon checking my messages it seems that you can't refill it? Very confused!     If you can give me a bottle to hold me until we can sort out the refill issue, I'll be in your building around one today.     Lluvia Nixon

## 2024-02-16 NOTE — TELEPHONE ENCOUNTER
RX APPROVAL:  Last OV  1324 ; plan, and labs reviewed    Pt has not been able to get [facility] to fill her prescription.    Sending to local pharmacy per pt request.

## 2024-02-22 NOTE — PROGRESS NOTES
[facility]  Advanced CHF/Pulmonary Hypertension   Cardiac Evaluation      Lluvia Nixon  YOB: 1955    Date of Visit:  2/23/24    Chief Complaint   Patient presents with    Congestive Heart Failure      History of Present Illness:  Lluvia Nixon is a 68 y.o. female who presents from referral from Dr. De La Cruz for consultation and management of ongoing dyspnea. He recently saw her as a new patient with decreased stamina/energy, fatigue, and SOB. She is s/p Covid in September; she had episode of very high platelets that was treated with 3 pheresis treatments.     She has a PMH HTN, MVP, MVr 08/2022, PAF post op MVr. 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 Her appendage was not occluded with her MV surgery.      ECHO 10/20/23 with normal LV fx, LVH, bi-atrial enlargement.    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      She states she felt better and more energized since starting Entresto. ~Started Entresto 24-26mg HALF tablet twice a day 10/28/23. Increased to a full dose 1/3/24.    12/13/23 cardiac MRI> Normal EF and normal strain and no scarring or inflammation of the heart.  No evidence of hemochromatosis of the heart.    Today, she is here for a follow up for diastolic heart failure. She continues to see hematology.  She feels improved on Entresto.  She is going to [place] March 17 and feels great about the trip.  She has less palpitations.  Discussed her wonderful 6 minute duran walk at 471 meters. She notes, she has not been drinking enough water.  Discussed dehydration with Entresto and need to drink.  Denies chest pain, shortness of breath, syncope, orthopnea, palpitations, or dizziness.           NYHA Class II    Allergies   Allergen Reactions    Latex Itching and Rash    Adhesive Tape Rash     Current Outpatient Medications   Medication Sig Dispense Refill    sacubitril-valsartan (ENTRESTO) 24-26 MG per tablet Take 1 tablet by mouth 2 times daily 180 tablet 3

## 2024-02-22 NOTE — PROGRESS NOTES
[facility]  Advanced CHF/Pulmonary Hypertension   Cardiac Evaluation      Lluvia Nixon  YOB: 1955    Date of Visit:  1/3/24    Chief Complaint   Patient presents with    1 Month Follow-Up    Congestive Heart Failure      History of Present Illness:  Lluvia Nixon is a 68 y.o. female who presents from referral from Dr. De La Cruz for consultation and management of ongoing dyspnea. He recently saw her as a new patient with decreased stamina/energy, fatigue, and SOB. She is s/p Covid in September; she had episode of very high platelets that was treated with 3 pheresis treatments.     She has a PMH HTN, MVP, MVr 08/2022, PAF post op MVr. 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 Her appendage was not occluded with her MV surgery.      ECHO 10/20/23 with normal LV fx, LVH, bi-atrial enlargement.    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      She states she felt better and more energized since starting Entresto. ~Started Entresto 24-26mg HALF tablet twice a day 10/28/23. Increased to a full dose 1/3/24.    12/13/23 cardiac MRI> Normal EF and normal strain and no scarring or inflammation of the heart.  No evidence of hemochromatosis of the heart.    Today, she is here for a follow up for diastolic heart failure.   She hopes to vacation to [place] in March.      NYHA Class II    Allergies   Allergen Reactions    Latex Itching and Rash    Adhesive Tape Rash     Current Outpatient Medications   Medication Sig Dispense Refill    metoprolol succinate (TOPROL XL) 25 MG extended release tablet Take 1 tablet by mouth every morning Take an extra 1 tablet as needed for increased heart rate. (Patient taking differently: Take 2 tablets by mouth every morning Take an extra 1 tablet as needed for increased heart rate.) 110 tablet 3    sacubitril-valsartan (ENTRESTO) 24-26 MG per tablet Take 1 tablet by mouth 2 times daily 180 tablet 3    Boswellia-Glucosamine-Vit D (OSTEO BI-FLEX ONE PER DAY

## 2024-02-23 ENCOUNTER — OFFICE VISIT (OUTPATIENT)
Dept: CARDIOLOGY CLINIC | Age: 69
End: 2024-02-23

## 2024-02-23 VITALS
HEIGHT: 67 IN | WEIGHT: 164 LBS | SYSTOLIC BLOOD PRESSURE: 96 MMHG | OXYGEN SATURATION: 96 % | DIASTOLIC BLOOD PRESSURE: 60 MMHG | HEART RATE: 76 BPM | BODY MASS INDEX: 25.74 KG/M2

## 2024-02-23 DIAGNOSIS — R06.02 SOB (SHORTNESS OF BREATH): ICD-10-CM

## 2024-02-23 DIAGNOSIS — I48.0 PAF (PAROXYSMAL ATRIAL FIBRILLATION) (HCC): ICD-10-CM

## 2024-02-23 DIAGNOSIS — D50.9 IRON DEFICIENCY ANEMIA, UNSPECIFIED IRON DEFICIENCY ANEMIA TYPE: ICD-10-CM

## 2024-02-23 DIAGNOSIS — I50.32 CHRONIC DIASTOLIC HEART FAILURE (HCC): Primary | ICD-10-CM

## 2024-02-23 DIAGNOSIS — E83.110 HEREDITARY HEMOCHROMATOSIS (HCC): ICD-10-CM

## 2024-02-23 DIAGNOSIS — G47.33 OSA ON CPAP: ICD-10-CM

## 2024-02-23 DIAGNOSIS — Z95.818 IMPLANTABLE LOOP RECORDER PRESENT: ICD-10-CM

## 2024-02-23 NOTE — PATIENT INSTRUCTIONS
PLAN:  Drink 12-16oz of non-caffeine beverage in the morning when you first wake up.    2.   Labs before next office visit.  CMP, BNP.  (CBC per hematology)   3.   Continue all medications.   4.   See Dr. Lane in 4 months

## 2024-06-27 NOTE — PROGRESS NOTES
50mg per EP on 1/3/24.   H&H 9.8/29.9, WBC 15.3  H&H 10.8/33.2 10/17/23  TSH 3.18 11/16/23    H&H 9.8/29.9, (1/10/24), Ferritin 365, Iron 201, TIBC 238 (1/2/24)  [id number] Score of 3 ( age, gender, HTN)  - She is on Eliquis 5 mg daily - continue    - Her appendage was not occluded with MV surgery       MV repair  MV Repair/annuloplasty 08/30/2022 Dr. Tr Erickson ([facility]) Atrial Fib post -op  ECHO 10/20/23> Ring in place, mpg 4mmHg.      KIESHA  Stable on C-PAP. Complaint.   Encourage to use machine to prevent long term effects of untreated KIESHA     SOB  With just walking.  Desaturated to 83-84% on Room Air on initial visit.    10/17/23> K+ 5.4; 11/16/23 > 5.0; 1/2/24> 5.2  ECHO 10/20/23> EF 60%, indeterminate DD, RVSP 35mmHg, mild cLVH, severe bi-atrial enlargement.     PSVT/AVNRT  F/w EP  s/p AVNRT ablation  062015   - has followed with Dr. Silvio Pedro at [place]   - associated with lightheadedness  - had vasovagal syncope 20s        PLAN:                Time Based Itemization  A total of 40 minutes was spent on today's patient encounter.  If applicable, non-patient-facing activities:  ( x)Preparing to see the patient and reviewing records  ( x) Individual interpretation of results  ( ) Discussion or coordination of care with other health care professionals  ( x) Ordering of unique tests, medications, or procedures  ( x) Documentation within the EHR        I appreciate the opportunity of cooperating in the care of this patient.    Amanda Lane M.D., [facility]

## 2024-06-28 NOTE — PROGRESS NOTES
(shortness of breath)    7. Screening cholesterol level    8. Chronic diastolic congestive heart failure (HCC)          Diastolic Dysfunction  Stable. No HF symptoms.   ~Compensated by exam.   ~Feels improved on Entresto  ~ (1/2/24)     Heart Failure Therapies:  The 4 Pillars of Heart Failure Therapies    ACE inhibitors, angiotensin receptor blockers, or ARNI (Entresto):  ~Started Entresto 24-26mg HALF tablet twice a day 10/28/23. Increased to a full dose 1/3/24  ~7/1/24 Decrease Entresto AM dose to 1/2 tab and keep PM dose full tab due to low BP and low Stamina.     2.   Beta blockers:  ~Toprol XL 50 mg daily     3.   Aldosterone blockers:  NA. Not on therapy    4.   SGLT2 inhibitors:    NA.  Not on therapy    Myelofibrosis/ hemochromatosis  Follows at [facility].   No hemochrom seen on MRI 12/2023.    Paroxysmal atrial fibrillation  F/w EP  2 week holter 10/2023 showed no recurrent afib   EKG : sinus   Advised monitoring at home for rhythm rate.  Noted post MV repair - 08/2022   Continue Toprol 50mg & Eliquis 5mg (also on adult asa).   ~Decreased Toprol to 25mg daily due to low HR with activity 10/27/23. Later increased dose back to 50mg per EP on 1/3/24.   H&H 9.8/29.9, WBC 15.3  H&H 10.8/33.2 10/17/23  TSH 3.18 11/16/23    H&H 9.8/29.9, (1/10/24), Ferritin 365, Iron 201, TIBC 238 (1/2/24)  [id number] Score of 3 ( age, gender, HTN)  - She is on Eliquis 5 mg daily - continue    - Her appendage was Not occluded with MV surgery       MV repair  MV Repair/annuloplasty 08/30/2022 Dr. Tr Erickson ([facility]) Atrial Fib post -op  ECHO 10/20/23> Ring in place, mpg 4mmHg.      KIESHA  Stable on C-PAP. Complaint.   Encourage to use machine to prevent long term effects of untreated KIESHA     SOB  With just walking.  Desaturated to 83-84% on Room Air on initial visit.    10/17/23> K+ 5.4; 11/16/23 > 5.0; 1/2/24> 5.2  ECHO 10/20/23> EF 60%, indeterminate DD, RVSP 35mmHg, mild cLVH, severe bi-atrial

## 2024-07-01 ENCOUNTER — OFFICE VISIT (OUTPATIENT)
Dept: CARDIOLOGY CLINIC | Age: 69
End: 2024-07-01
Payer: COMMERCIAL

## 2024-07-01 VITALS
SYSTOLIC BLOOD PRESSURE: 88 MMHG | WEIGHT: 166 LBS | HEIGHT: 67 IN | OXYGEN SATURATION: 98 % | BODY MASS INDEX: 26.06 KG/M2 | HEART RATE: 68 BPM | DIASTOLIC BLOOD PRESSURE: 64 MMHG

## 2024-07-01 DIAGNOSIS — I50.32 CHRONIC DIASTOLIC CONGESTIVE HEART FAILURE (HCC): ICD-10-CM

## 2024-07-01 DIAGNOSIS — E83.110 HEREDITARY HEMOCHROMATOSIS (HCC): ICD-10-CM

## 2024-07-01 DIAGNOSIS — R06.02 SOB (SHORTNESS OF BREATH): ICD-10-CM

## 2024-07-01 DIAGNOSIS — D50.9 IRON DEFICIENCY ANEMIA, UNSPECIFIED IRON DEFICIENCY ANEMIA TYPE: ICD-10-CM

## 2024-07-01 DIAGNOSIS — G47.33 OSA ON CPAP: ICD-10-CM

## 2024-07-01 DIAGNOSIS — I50.32 CHRONIC DIASTOLIC HEART FAILURE (HCC): Primary | ICD-10-CM

## 2024-07-01 DIAGNOSIS — I48.0 PAF (PAROXYSMAL ATRIAL FIBRILLATION) (HCC): ICD-10-CM

## 2024-07-01 DIAGNOSIS — Z13.220 SCREENING CHOLESTEROL LEVEL: ICD-10-CM

## 2024-07-01 PROCEDURE — 99215 OFFICE O/P EST HI 40 MIN: CPT | Performed by: INTERNAL MEDICINE

## 2024-07-01 PROCEDURE — 1123F ACP DISCUSS/DSCN MKR DOCD: CPT | Performed by: INTERNAL MEDICINE

## 2024-07-01 NOTE — PATIENT INSTRUCTIONS
PLAN:  DECREASE morning dose of Entresto to HALF tablet and keep PM dose to a FULL tablet.  (Keep script the same).  This will allow blood pressure to increase a bit.   2.   Echo in October/November with an office visit.

## 2024-07-09 LAB
B-TYPE NATRIURETIC PEPTIDE: 461 PG/ML (ref 0–80)
CHOLESTEROL, TOTAL: NORMAL MG/DL
HDLC SERPL-MCNC: NORMAL MG/DL
LDL CHOLESTEROL: NORMAL MG/DL
NON HDL CHOL. (LDL+VLDL): NORMAL MG/DL
TRIGL SERPL-MCNC: NORMAL MG/DL

## 2024-07-10 ENCOUNTER — TELEPHONE (OUTPATIENT)
Dept: CARDIOLOGY CLINIC | Age: 69
End: 2024-07-10

## 2024-07-10 RX ORDER — FUROSEMIDE 20 MG/1
20 TABLET ORAL DAILY PRN
Qty: 60 TABLET | Refills: 2 | Status: SHIPPED | OUTPATIENT
Start: 2024-07-10

## 2024-07-10 RX ORDER — FUROSEMIDE 20 MG/1
20 TABLET ORAL DAILY PRN
COMMUNITY
End: 2024-07-10 | Stop reason: SDUPTHER

## 2024-07-10 NOTE — TELEPHONE ENCOUNTER
Pt is returning message from her my chart from JIGNESH concerning pt starting lasix.  Pt has questions.    Please call pt to discuss.

## 2024-07-10 NOTE — TELEPHONE ENCOUNTER
----- Message from Amanda Lane MD sent at 7/9/2024  5:48 PM EDT -----  See below.  Call patient.  See if she has lasix.  20 mg qd prn swelling/sob.  Try taking once a  week for awhile.  JIGNESH Teixeira, your labs show that your BNP (fluid indicator) is higher than it was.  I want to be sure you have furosemide (water pill) to take if you feel you might need it.  You could actually try taking it once a week for awhile to see if this helps.  I just don't want this to get out of hand.  I will have the office call you.  Amanda Lane

## 2024-09-26 NOTE — PROGRESS NOTES
[facility]  Advanced CHF/Pulmonary Hypertension   Cardiac Evaluation      Lluvia Nixon  YOB: 1955    Date of Visit:  10/4/24    Chief Complaint   Patient presents with    Congestive Heart Failure    Shortness of Breath      History of Present Illness:  Lluvia Nixon is a 69 y.o. female who presents from referral from Dr. De La Cruz for consultation and management of ongoing dyspnea. He recently saw her as a new patient with decreased stamina/energy, fatigue, and SOB. She is s/p Covid in September; she had episode of very high platelets that was treated with 3 pheresis treatments.     She has a PMH HTN, MVP, MVr 08/2022, PAF post op MVr. 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 Her appendage was not occluded with her MV surgery.      ECHO 10/20/23 with normal LV fx, LVH, bi-atrial enlargement.    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      She states she felt better and more energized since starting Entresto. ~Started Entresto 24-26mg HALF tablet twice a day 10/28/23. Increased to a full dose 1/3/24.    Today, she is here for a follow up for diastolic heart failure. EF 62% on 12/13/23. Last EF 55-60% on 10/4/24. Sees Issac Herrera MD: Pulmonology. Dyspnea on exertion. She has trouble with stairs and inclines. Denies chest pain, palpitations or swelling. Normal gait, no mobility issues. She took Furosemide a couple of times this week. She states she had a hematoma on her left forearm because of her dog, not present now. So she got a medical bracelet to be safe.      NYHA Class II    Allergies   Allergen Reactions    Latex Itching and Rash    Adhesive Tape Rash     Current Outpatient Medications   Medication Sig Dispense Refill    furosemide (LASIX) 20 MG tablet Take 1 tablet by mouth daily as needed (swelling and/or shortness of breath) (Patient taking differently: Take 1 tablet by mouth as needed (swelling and/or shortness of breath)) 60 tablet 2

## 2024-10-04 ENCOUNTER — HOSPITAL ENCOUNTER (OUTPATIENT)
Age: 69
Discharge: HOME | End: 2024-10-06
Payer: COMMERCIAL

## 2024-10-04 ENCOUNTER — OFFICE VISIT (OUTPATIENT)
Dept: CARDIOLOGY CLINIC | Age: 69
End: 2024-10-04

## 2024-10-04 VITALS
BODY MASS INDEX: 26.06 KG/M2 | DIASTOLIC BLOOD PRESSURE: 58 MMHG | HEART RATE: 78 BPM | WEIGHT: 166 LBS | SYSTOLIC BLOOD PRESSURE: 100 MMHG | OXYGEN SATURATION: 95 % | HEIGHT: 67 IN

## 2024-10-04 VITALS
BODY MASS INDEX: 26.06 KG/M2 | DIASTOLIC BLOOD PRESSURE: 63 MMHG | WEIGHT: 166 LBS | HEIGHT: 67 IN | SYSTOLIC BLOOD PRESSURE: 101 MMHG

## 2024-10-04 DIAGNOSIS — I50.32 CHRONIC DIASTOLIC HEART FAILURE (HCC): Primary | ICD-10-CM

## 2024-10-04 DIAGNOSIS — G47.33 OSA ON CPAP: ICD-10-CM

## 2024-10-04 DIAGNOSIS — I50.32 CHRONIC DIASTOLIC CONGESTIVE HEART FAILURE (HCC): ICD-10-CM

## 2024-10-04 DIAGNOSIS — I47.10 PSVT (PAROXYSMAL SUPRAVENTRICULAR TACHYCARDIA) (HCC): ICD-10-CM

## 2024-10-04 DIAGNOSIS — I50.32 CHRONIC DIASTOLIC HEART FAILURE (HCC): ICD-10-CM

## 2024-10-04 DIAGNOSIS — I48.0 PAF (PAROXYSMAL ATRIAL FIBRILLATION) (HCC): ICD-10-CM

## 2024-10-04 DIAGNOSIS — I34.1 MVP (MITRAL VALVE PROLAPSE): ICD-10-CM

## 2024-10-04 DIAGNOSIS — R06.02 SOB (SHORTNESS OF BREATH): ICD-10-CM

## 2024-10-04 LAB
ECHO AO ASC DIAM: 3.6 CM
ECHO AO ASCENDING AORTA INDEX: 1.93 CM/M2
ECHO AO ROOT DIAM: 3.8 CM
ECHO AO ROOT INDEX: 2.03 CM/M2
ECHO AV AREA PEAK VELOCITY: 3.2 CM2
ECHO AV AREA VTI: 3.2 CM2
ECHO AV AREA/BSA PEAK VELOCITY: 1.7 CM2/M2
ECHO AV AREA/BSA VTI: 1.7 CM2/M2
ECHO AV MEAN GRADIENT: 3 MMHG
ECHO AV MEAN VELOCITY: 0.8 M/S
ECHO AV PEAK GRADIENT: 6 MMHG
ECHO AV PEAK VELOCITY: 1.2 M/S
ECHO AV VELOCITY RATIO: 0.83
ECHO AV VTI: 26.8 CM
ECHO BSA: 1.89 M2
ECHO EST RA PRESSURE: 3 MMHG
ECHO LA AREA 2C: 27.6 CM2
ECHO LA AREA 4C: 31.5 CM2
ECHO LA MAJOR AXIS: 7.1 CM
ECHO LA MINOR AXIS: 6.9 CM
ECHO LA VOL BP: 101 ML (ref 22–52)
ECHO LA VOL MOD A2C: 91 ML (ref 22–52)
ECHO LA VOL MOD A4C: 111 ML (ref 22–52)
ECHO LA VOL/BSA BIPLANE: 54 ML/M2 (ref 16–34)
ECHO LA VOLUME INDEX MOD A2C: 49 ML/M2 (ref 16–34)
ECHO LA VOLUME INDEX MOD A4C: 59 ML/M2 (ref 16–34)
ECHO LV E' LATERAL VELOCITY: 6.5 CM/S
ECHO LV E' SEPTAL VELOCITY: 5.7 CM/S
ECHO LV EDV A2C: 94 ML
ECHO LV EDV A4C: 67 ML
ECHO LV EDV INDEX A4C: 36 ML/M2
ECHO LV EDV NDEX A2C: 50 ML/M2
ECHO LV EF PHYSICIAN: 58 %
ECHO LV EJECTION FRACTION A2C: 57 %
ECHO LV EJECTION FRACTION A4C: 62 %
ECHO LV EJECTION FRACTION BIPLANE: 59 % (ref 55–100)
ECHO LV ESV A2C: 40 ML
ECHO LV ESV A4C: 26 ML
ECHO LV ESV INDEX A2C: 21 ML/M2
ECHO LV ESV INDEX A4C: 14 ML/M2
ECHO LV FRACTIONAL SHORTENING: 38 % (ref 28–44)
ECHO LV GLOBAL LONGITUDINAL STRAIN (GLS): -14.7 %
ECHO LV GLOBAL LONGITUDINAL STRAIN (GLS): -15.2 %
ECHO LV GLOBAL LONGITUDINAL STRAIN (GLS): -15.4 %
ECHO LV GLOBAL LONGITUDINAL STRAIN (GLS): -16.3 %
ECHO LV INTERNAL DIMENSION DIASTOLE INDEX: 2.09 CM/M2
ECHO LV INTERNAL DIMENSION DIASTOLIC: 3.9 CM (ref 3.9–5.3)
ECHO LV INTERNAL DIMENSION SYSTOLIC INDEX: 1.28 CM/M2
ECHO LV INTERNAL DIMENSION SYSTOLIC: 2.4 CM
ECHO LV IVSD: 1.7 CM (ref 0.6–0.9)
ECHO LV MASS 2D: 190.4 G (ref 67–162)
ECHO LV MASS INDEX 2D: 101.8 G/M2 (ref 43–95)
ECHO LV POSTERIOR WALL DIASTOLIC: 1 CM (ref 0.6–0.9)
ECHO LV RELATIVE WALL THICKNESS RATIO: 0.51
ECHO LVOT AREA: 3.8 CM2
ECHO LVOT AV VTI INDEX: 0.84
ECHO LVOT DIAM: 2.2 CM
ECHO LVOT MEAN GRADIENT: 2 MMHG
ECHO LVOT PEAK GRADIENT: 4 MMHG
ECHO LVOT PEAK VELOCITY: 1 M/S
ECHO LVOT STROKE VOLUME INDEX: 45.7 ML/M2
ECHO LVOT SV: 85.5 ML
ECHO LVOT VTI: 22.5 CM
ECHO MV A VELOCITY: 1.49 M/S
ECHO MV AREA PHT: 1.9 CM2
ECHO MV AREA VTI: 1.9 CM2
ECHO MV E VELOCITY: 1.18 M/S
ECHO MV E/A RATIO: 0.79
ECHO MV E/E' LATERAL: 18.15
ECHO MV E/E' RATIO (AVERAGED): 19.43
ECHO MV E/E' SEPTAL: 20.7
ECHO MV LVOT VTI INDEX: 2
ECHO MV MAX VELOCITY: 1.6 M/S
ECHO MV MEAN GRADIENT: 5 MMHG
ECHO MV MEAN VELOCITY: 1 M/S
ECHO MV PEAK GRADIENT: 10 MMHG
ECHO MV PRESSURE HALF TIME (PHT): 115 MS
ECHO MV VTI: 45 CM
ECHO PV MAX VELOCITY: 0.9 M/S
ECHO PV PEAK GRADIENT: 3 MMHG
ECHO RA AREA 4C: 17 CM2
ECHO RA END SYSTOLIC VOLUME APICAL 4 CHAMBER INDEX BSA: 22 ML/M2
ECHO RA VOLUME: 42 ML
ECHO RIGHT VENTRICULAR SYSTOLIC PRESSURE (RVSP): 28 MMHG
ECHO RV FREE WALL PEAK S': 9.4 CM/S
ECHO RV TAPSE: 1.7 CM (ref 1.7–?)
ECHO TV REGURGITANT MAX VELOCITY: 2.49 M/S
ECHO TV REGURGITANT PEAK GRADIENT: 25 MMHG

## 2024-10-04 PROCEDURE — 93356 MYOCRD STRAIN IMG SPCKL TRCK: CPT

## 2024-10-04 PROCEDURE — 93306 TTE W/DOPPLER COMPLETE: CPT | Performed by: INTERNAL MEDICINE

## 2024-10-04 PROCEDURE — 93356 MYOCRD STRAIN IMG SPCKL TRCK: CPT | Performed by: INTERNAL MEDICINE

## 2024-10-04 NOTE — PATIENT INSTRUCTIONS
Continue current medications, no changes  Routine labs soon :BNP, BMP  Follow up with Dr. Lane in 6 months

## 2024-10-07 ENCOUNTER — TELEPHONE (OUTPATIENT)
Dept: CARDIOLOGY CLINIC | Age: 69
End: 2024-10-07

## 2024-10-21 RX ORDER — METOPROLOL SUCCINATE 50 MG/1
50 TABLET, EXTENDED RELEASE ORAL EVERY MORNING
Qty: 90 TABLET | Refills: 3 | Status: SHIPPED | OUTPATIENT
Start: 2024-10-21

## 2024-10-21 NOTE — TELEPHONE ENCOUNTER
Prescription refill:  Metoprolol    Refill parameters per protocol were met     Last OV:  10/4/24    Future Appt:  4/4/25    Labs:    Lab Results   Component Value Date     07/09/2024    K 5.1 07/09/2024     07/09/2024    CO2 25 07/09/2024    BUN 20 07/09/2024    CREATININE 0.95 07/09/2024    GLUCOSE 100 (H) 07/09/2024    CALCIUM 9.5 07/09/2024    BILITOT 0.3 11/16/2023    ALKPHOS 104 11/16/2023    AST 27 11/16/2023    ALT 10 11/16/2023    LABGLOM >60 01/02/2024       Lab Results   Component Value Date    CHOL 157 07/09/2024    TRIG 154 (H) 07/09/2024    HDL 60 07/09/2024    LDL 66 07/09/2024       Lab Results   Component Value Date    ALT 10 11/16/2023    AST 27 11/16/2023    ALKPHOS 104 11/16/2023    BILITOT 0.3 11/16/2023

## 2024-11-05 LAB
ANION GAP SERPL CALCULATED.3IONS-SCNC: 8 MMOL/L (ref 4–16)
B-TYPE NATRIURETIC PEPTIDE: 392 PG/ML (ref 0–80)
BUN BLDV-MCNC: 25 MG/DL (ref 8–26)
CALCIUM SERPL-MCNC: 9.2 MG/DL (ref 8.5–10.4)
CHLORIDE BLD-SCNC: 110 MMOL/L (ref 98–111)
CO2: 25 MMOL/L (ref 21–31)
CREAT SERPL-MCNC: 0.97 MG/DL (ref 0.6–1.2)
EGFR (CKD-EPI): 63 ML/MIN/1.73 M2
GLUCOSE BLD-MCNC: 103 MG/DL (ref 70–99)
POTASSIUM SERPL-SCNC: 4.8 MMOL/L (ref 3.6–5.1)
SODIUM BLD-SCNC: 143 MMOL/L (ref 135–145)

## 2025-01-07 NOTE — PROGRESS NOTES
longitudal  strain study is abnormal.  2. Mitral valve: Prior procedures included surgical repair. The  sewing ring had no rocking motion and showed no evidence of  dehiscence. The annulus was moderately calcified. The leaflets  were mildly thickened and mildly calcified. Thickening. Mild  diffuse thickening. Mean gradient (D): 3mm Hg.  3. Left atrium: The atrium was moderately dilated.  4. Right ventricle: The cavity size was normal. Wall thickness was  normal. Systolic function was normal.  5. Pulmonary arteries: Estimated PA peak pressure is 37mm Hg (S).  6. Pericardium, extracardiac: There was no pericardial effusion.    Echo 11/16/2022 [place]   1. Left ventricle: The cavity size was normal. Wall thickness was      normal. Systolic function was normal. The estimated ejection      fraction was in the range of 60% to 65%. Wall motion was normal;      there were no regional wall motion abnormalities. Doppler      parameters are consistent with abnormal left ventricular      relaxation (grade 1 diastolic dysfunction).   2. Mitral valve: A bioprosthesis was present and functioning      normally. The prosthesis had a normal range of motion. Mean      gradient (D): 3mm Hg. Valve area by pressure half-time: 4.6cm^2.      Valve area by continuity equation (using LVOT flow): 1.7cm^2.   3. Right ventricle: The cavity size was normal. Wall thickness was      normal. Systolic function was normal.   4. Pulmonary arteries: Estimated PA peak pressure is 32mm Hg (S).   5. Pericardium, extracardiac: There was no pericardial effusion.     Echo 9/2022     1. Left ventricle: The cavity size was normal. There was mild      concentric hypertrophy. Systolic function was normal. The      estimated ejection fraction was in the range of 60% to 65%. Wall      motion was normal; there were no regional wall motion      abnormalities. Unable to assess diastolic function.   2. Aortic valve: There was trivial regurgitation.   3. Mitral

## 2025-01-15 ENCOUNTER — ANCILLARY PROCEDURE (OUTPATIENT)
Dept: CARDIOLOGY CLINIC | Age: 70
End: 2025-01-15

## 2025-01-15 ENCOUNTER — OFFICE VISIT (OUTPATIENT)
Dept: CARDIOLOGY CLINIC | Age: 70
End: 2025-01-15
Payer: MEDICARE

## 2025-01-15 VITALS
OXYGEN SATURATION: 97 % | SYSTOLIC BLOOD PRESSURE: 118 MMHG | HEART RATE: 72 BPM | DIASTOLIC BLOOD PRESSURE: 62 MMHG | WEIGHT: 168 LBS | BODY MASS INDEX: 26.37 KG/M2 | HEIGHT: 67 IN

## 2025-01-15 DIAGNOSIS — I48.0 PAF (PAROXYSMAL ATRIAL FIBRILLATION) (HCC): ICD-10-CM

## 2025-01-15 DIAGNOSIS — R00.2 PALPITATIONS: Primary | ICD-10-CM

## 2025-01-15 DIAGNOSIS — R00.2 PALPITATIONS: ICD-10-CM

## 2025-01-15 DIAGNOSIS — I50.32 CHRONIC DIASTOLIC HEART FAILURE (HCC): ICD-10-CM

## 2025-01-15 DIAGNOSIS — I47.10 PSVT (PAROXYSMAL SUPRAVENTRICULAR TACHYCARDIA) (HCC): ICD-10-CM

## 2025-01-15 DIAGNOSIS — I47.10 PSVT (PAROXYSMAL SUPRAVENTRICULAR TACHYCARDIA): ICD-10-CM

## 2025-01-15 DIAGNOSIS — G47.33 OSA ON CPAP: ICD-10-CM

## 2025-01-15 LAB — ECHO BSA: 1.9 M2

## 2025-01-15 PROCEDURE — 3017F COLORECTAL CA SCREEN DOC REV: CPT | Performed by: INTERNAL MEDICINE

## 2025-01-15 PROCEDURE — 1090F PRES/ABSN URINE INCON ASSESS: CPT | Performed by: INTERNAL MEDICINE

## 2025-01-15 PROCEDURE — 93000 ELECTROCARDIOGRAM COMPLETE: CPT | Performed by: INTERNAL MEDICINE

## 2025-01-15 PROCEDURE — G8419 CALC BMI OUT NRM PARAM NOF/U: HCPCS | Performed by: INTERNAL MEDICINE

## 2025-01-15 PROCEDURE — G8427 DOCREV CUR MEDS BY ELIG CLIN: HCPCS | Performed by: INTERNAL MEDICINE

## 2025-01-15 PROCEDURE — 1036F TOBACCO NON-USER: CPT | Performed by: INTERNAL MEDICINE

## 2025-01-15 PROCEDURE — 1123F ACP DISCUSS/DSCN MKR DOCD: CPT | Performed by: INTERNAL MEDICINE

## 2025-01-15 PROCEDURE — G8400 PT W/DXA NO RESULTS DOC: HCPCS | Performed by: INTERNAL MEDICINE

## 2025-01-15 PROCEDURE — 1159F MED LIST DOCD IN RCRD: CPT | Performed by: INTERNAL MEDICINE

## 2025-01-15 PROCEDURE — 99214 OFFICE O/P EST MOD 30 MIN: CPT | Performed by: INTERNAL MEDICINE

## 2025-02-21 ENCOUNTER — TELEPHONE (OUTPATIENT)
Dept: CARDIOLOGY CLINIC | Age: 70
End: 2025-02-21

## 2025-02-26 LAB — ECHO BSA: 1.9 M2

## 2025-02-26 RX ORDER — SACUBITRIL AND VALSARTAN 24; 26 MG/1; MG/1
1 TABLET, FILM COATED ORAL 2 TIMES DAILY
Qty: 180 TABLET | Refills: 3 | Status: SHIPPED | OUTPATIENT
Start: 2025-02-26

## 2025-02-26 NOTE — TELEPHONE ENCOUNTER
Requested Prescriptions     Pending Prescriptions Disp Refills    sacubitril-valsartan (ENTRESTO) 24-26 MG per tablet 180 tablet 3     Sig: Take 1/2 tablet in the Morning and Full tablet in the Evening.      Last OV:  1/15/2025     Next OV: 4/2/2025     Last Labs: bmp 11/05/2024    Last Filled: 07/01/2024 JIGNESH

## 2025-03-04 PROBLEM — R09.02 HYPOXIA: Status: RESOLVED | Noted: 2023-12-13 | Resolved: 2025-03-04

## 2025-03-04 PROBLEM — R06.02 SOB (SHORTNESS OF BREATH): Status: RESOLVED | Noted: 2023-12-13 | Resolved: 2025-03-04

## 2025-03-05 NOTE — PROGRESS NOTES
rhythm  Very short episode of atrial fibrillation longest 1 minute 43 seconds.  8 short PAT's, longest 2.5 minutes.  Infrequent PAC, 0.2%  Symptoms with sinus rhythm and sinus tachycardia    ECHO 10/4/24    Left Ventricle: Normal left ventricular systolic function with a visually estimated EF of 55 - 60%. EF by 2D Simpsons Biplane is 59%. Left ventricle size is normal. Severe septal thickening. Normal wall motion. Global longitudinal strain is reduced with a value of -15.4%. Grade II diastolic dysfunction with increased LAP.    Right Ventricle: Not well visualized.    Aortic Valve: Trileaflet valve. Sclerosis of the aortic valve cusps.    Mitral Valve: Valve repaired by annular ring. MV mean gradient is 5 mmHg. MV peak velocity 1.55 m/s. MVA by PHT 1.91 cm2.    Tricuspid Valve: Mild regurgitation. The estimated RVSP is 28 mmHg.    Left Atrium: Left atrium is severely dilated.    Aorta: Normal sized ascending aorta. Dilated aortic root. Ao root diameter is 3.8 cm.    Image quality is adequate.    6 Minute Smtih Walk 1/18/24  487 Meters Walked without any desaturation. Joanie dyspnea scale of 5.   Before walk: HR 73, Saturation 96% RA  During walk:  HR 88, Saturation 98% RA  Immediately After walk:  HR 92, O2 saturation 96% RA  5 Minutes after walk: HR 90, O2 saturation 96% RA    Labs were reviewed including labs from other hospital systems through [facility].  Cardiac testing was reviewed including echos, nuclear scans, cardiac catheterization, including from other hospital systems through [facility].    Assessment:    1. Chronic diastolic heart failure (HCC)    2. PAF (paroxysmal atrial fibrillation) (HCC)    3. KIESHA on CPAP    4. MVP (mitral valve prolapse)    5. SOB (shortness of breath)    6. Screening cholesterol level    7. Chronic diastolic congestive heart failure (HCC)          Diastolic Dysfunction  BNP  11/5/24: 392  ECHO  10/4/24:  EF 55-60%    Heart Failure Therapies:  The 4 Pillars of Heart

## 2025-04-04 ENCOUNTER — OFFICE VISIT (OUTPATIENT)
Dept: CARDIOLOGY CLINIC | Age: 70
End: 2025-04-04
Payer: MEDICARE

## 2025-04-04 VITALS
WEIGHT: 167 LBS | SYSTOLIC BLOOD PRESSURE: 96 MMHG | OXYGEN SATURATION: 99 % | BODY MASS INDEX: 26.21 KG/M2 | HEART RATE: 77 BPM | DIASTOLIC BLOOD PRESSURE: 54 MMHG | HEIGHT: 67 IN

## 2025-04-04 DIAGNOSIS — Z13.220 SCREENING CHOLESTEROL LEVEL: ICD-10-CM

## 2025-04-04 DIAGNOSIS — I34.1 MVP (MITRAL VALVE PROLAPSE): ICD-10-CM

## 2025-04-04 DIAGNOSIS — I48.0 PAF (PAROXYSMAL ATRIAL FIBRILLATION) (HCC): ICD-10-CM

## 2025-04-04 DIAGNOSIS — I50.32 CHRONIC DIASTOLIC CONGESTIVE HEART FAILURE (HCC): ICD-10-CM

## 2025-04-04 DIAGNOSIS — I50.32 CHRONIC DIASTOLIC HEART FAILURE (HCC): Primary | ICD-10-CM

## 2025-04-04 DIAGNOSIS — G47.33 OSA ON CPAP: ICD-10-CM

## 2025-04-04 DIAGNOSIS — R06.02 SOB (SHORTNESS OF BREATH): ICD-10-CM

## 2025-04-04 PROCEDURE — G2211 COMPLEX E/M VISIT ADD ON: HCPCS | Performed by: INTERNAL MEDICINE

## 2025-04-04 PROCEDURE — 1036F TOBACCO NON-USER: CPT | Performed by: INTERNAL MEDICINE

## 2025-04-04 PROCEDURE — G8400 PT W/DXA NO RESULTS DOC: HCPCS | Performed by: INTERNAL MEDICINE

## 2025-04-04 PROCEDURE — 1159F MED LIST DOCD IN RCRD: CPT | Performed by: INTERNAL MEDICINE

## 2025-04-04 PROCEDURE — 99215 OFFICE O/P EST HI 40 MIN: CPT | Performed by: INTERNAL MEDICINE

## 2025-04-04 PROCEDURE — 3017F COLORECTAL CA SCREEN DOC REV: CPT | Performed by: INTERNAL MEDICINE

## 2025-04-04 PROCEDURE — G8419 CALC BMI OUT NRM PARAM NOF/U: HCPCS | Performed by: INTERNAL MEDICINE

## 2025-04-04 PROCEDURE — G8427 DOCREV CUR MEDS BY ELIG CLIN: HCPCS | Performed by: INTERNAL MEDICINE

## 2025-04-04 PROCEDURE — 1090F PRES/ABSN URINE INCON ASSESS: CPT | Performed by: INTERNAL MEDICINE

## 2025-04-04 PROCEDURE — 1123F ACP DISCUSS/DSCN MKR DOCD: CPT | Performed by: INTERNAL MEDICINE

## 2025-04-04 NOTE — PATIENT INSTRUCTIONS
Continue current medications, no changes  Echo with next office visit  Fasting labs (FASTING for 8-10 hours) with next office visit: BMP and BNP and LIPID+ magnesium  Follow up with Amanda Lane MD in 6 months  Call my office for any worsening symptoms such as shortness of breath, chest pain, sudden weight gain or loss, or any increased [phone]

## 2025-04-25 ENCOUNTER — TELEPHONE (OUTPATIENT)
Dept: CARDIOLOGY CLINIC | Age: 70
End: 2025-04-25

## 2025-04-25 DIAGNOSIS — I48.0 PAF (PAROXYSMAL ATRIAL FIBRILLATION) (HCC): Primary | ICD-10-CM

## 2025-04-25 RX ORDER — METOPROLOL SUCCINATE 50 MG/1
50 TABLET, EXTENDED RELEASE ORAL EVERY MORNING
Qty: 90 TABLET | Refills: 3 | Status: SHIPPED | OUTPATIENT
Start: 2025-04-25

## 2025-04-25 NOTE — TELEPHONE ENCOUNTER
Pt has new pharmacy, please send to [facility]    Medication Refill    Medication needing refilled:  metoprolol succinate (TOPROL XL)     Dosage of the medication:  50 MG extended release tablet     How are you taking this medication (QD, BID, TID, QID, PRN):  TAKE 1 TABLET BY MOUTH EVERY MORNING     30 or 90 day supply called in:  90 days    When will you run out of your medication:    Which Pharmacy are we sending the medication to?:  [facility] - [facility] [address] - P [phone] - F [phone]

## 2025-04-25 NOTE — TELEPHONE ENCOUNTER
Pt last seen 4/4/25. No medication changes.   Next appt 10/15/25 with JIGNESH     Also follows with EP  Toprol XL 50mg refilled as requested.

## 2025-07-22 RX ORDER — SACUBITRIL AND VALSARTAN 24; 26 MG/1; MG/1
1 TABLET, FILM COATED ORAL 2 TIMES DAILY
Qty: 180 TABLET | Refills: 3 | Status: SHIPPED | OUTPATIENT
Start: 2025-07-22

## 2025-07-22 NOTE — TELEPHONE ENCOUNTER
Spoke to patient she is changing pharmacies from [facility] to [facility]  Last OV 04/04/2025

## 2025-08-26 LAB
ANION GAP SERPL CALCULATED.3IONS-SCNC: 5 MMOL/L (ref 4–16)
B-TYPE NATRIURETIC PEPTIDE: 217 PG/ML (ref 0–80)
BUN BLDV-MCNC: 17 MG/DL (ref 8–26)
CALCIUM SERPL-MCNC: 9.3 MG/DL (ref 8.5–10.4)
CHLORIDE BLD-SCNC: 108 MMOL/L (ref 98–111)
CHOLESTEROL, TOTAL: 166 MG/DL
CO2: 29 MMOL/L (ref 21–31)
CREAT SERPL-MCNC: 0.93 MG/DL (ref 0.6–1.2)
EGFR (CKD-EPI): 66 ML/MIN/1.73 M2
GLUCOSE BLD-MCNC: 95 MG/DL (ref 70–99)
HDLC SERPL-MCNC: 59 MG/DL
LDL CHOLESTEROL: 77 MG/DL
MAGNESIUM: 2.1 MG/DL (ref 1.7–2.4)
NONHDLC SERPL-MCNC: 107 MG/DL
POTASSIUM SERPL-SCNC: 5.2 MMOL/L (ref 3.6–5.1)
SODIUM BLD-SCNC: 142 MMOL/L (ref 135–145)
TRIGL SERPL-MCNC: 149 MG/DL